# Patient Record
Sex: FEMALE | Race: WHITE | NOT HISPANIC OR LATINO | URBAN - METROPOLITAN AREA
[De-identification: names, ages, dates, MRNs, and addresses within clinical notes are randomized per-mention and may not be internally consistent; named-entity substitution may affect disease eponyms.]

---

## 2018-07-15 ENCOUNTER — EMERGENCY (EMERGENCY)
Facility: HOSPITAL | Age: 53
LOS: 1 days | Discharge: HOME | End: 2018-07-15
Attending: EMERGENCY MEDICINE | Admitting: EMERGENCY MEDICINE
Payer: COMMERCIAL

## 2018-07-15 VITALS
WEIGHT: 160.06 LBS | HEART RATE: 80 BPM | OXYGEN SATURATION: 99 % | HEIGHT: 61 IN | TEMPERATURE: 99 F | SYSTOLIC BLOOD PRESSURE: 130 MMHG | DIASTOLIC BLOOD PRESSURE: 89 MMHG | RESPIRATION RATE: 16 BRPM

## 2018-07-15 VITALS
SYSTOLIC BLOOD PRESSURE: 127 MMHG | RESPIRATION RATE: 16 BRPM | TEMPERATURE: 98 F | OXYGEN SATURATION: 99 % | HEART RATE: 82 BPM | DIASTOLIC BLOOD PRESSURE: 88 MMHG

## 2018-07-15 PROCEDURE — 70450 CT HEAD/BRAIN W/O DYE: CPT | Mod: 26

## 2018-07-15 PROCEDURE — 73502 X-RAY EXAM HIP UNI 2-3 VIEWS: CPT | Mod: 26,LT

## 2018-07-15 PROCEDURE — 99284 EMERGENCY DEPT VISIT MOD MDM: CPT

## 2018-07-15 PROCEDURE — 73502 X-RAY EXAM HIP UNI 2-3 VIEWS: CPT

## 2018-07-15 PROCEDURE — 99284 EMERGENCY DEPT VISIT MOD MDM: CPT | Mod: 25

## 2018-07-15 PROCEDURE — 73080 X-RAY EXAM OF ELBOW: CPT | Mod: 26,LT

## 2018-07-15 PROCEDURE — 73080 X-RAY EXAM OF ELBOW: CPT

## 2018-07-15 PROCEDURE — 70450 CT HEAD/BRAIN W/O DYE: CPT

## 2018-07-15 RX ORDER — ONDANSETRON 8 MG/1
1 TABLET, FILM COATED ORAL
Qty: 9 | Refills: 0
Start: 2018-07-15 | End: 2018-07-17

## 2018-07-15 RX ORDER — ONDANSETRON 8 MG/1
4 TABLET, FILM COATED ORAL ONCE
Qty: 0 | Refills: 0 | Status: COMPLETED | OUTPATIENT
Start: 2018-07-15 | End: 2018-07-15

## 2018-07-15 RX ORDER — MECLIZINE HCL 12.5 MG
25 TABLET ORAL ONCE
Qty: 0 | Refills: 0 | Status: COMPLETED | OUTPATIENT
Start: 2018-07-15 | End: 2018-07-15

## 2018-07-15 RX ORDER — MECLIZINE HCL 12.5 MG
1 TABLET ORAL
Qty: 9 | Refills: 0
Start: 2018-07-15 | End: 2018-07-17

## 2018-07-15 RX ORDER — ACETAMINOPHEN 500 MG
650 TABLET ORAL ONCE
Qty: 0 | Refills: 0 | Status: COMPLETED | OUTPATIENT
Start: 2018-07-15 | End: 2018-07-15

## 2018-07-15 RX ADMIN — Medication 650 MILLIGRAM(S): at 15:06

## 2018-07-15 RX ADMIN — ONDANSETRON 4 MILLIGRAM(S): 8 TABLET, FILM COATED ORAL at 15:06

## 2018-07-15 NOTE — ED PROVIDER NOTE - OBJECTIVE STATEMENT
51 y/o F with hx of hypothyroidism presents with c/o L elbow pain and headache s/p fall yesterday. Pt states that she tripped and fell while carrying a tray at home yesterday. States that she fell hitting her head, L elbow and L leg. Pt states that she has been having headache with mild dizziness and nausea since. Denies numbness, tingling, focal weakness, LOC, use of blood thinners, vomiting, vision changes, CP, SOB, abdominal pain, bowel/bladder incontinence, neck pain or other symptoms.

## 2018-07-15 NOTE — ED PROVIDER NOTE - UPPER EXTREMITY EXAM, LEFT
+ttp with mild healing abrasions and ecchymosis noted to L elbow with FROM, fingers warm& mobile, cap refill<2sec, pulses and sensation intact, NVI/BRUISING/TENDERNESS

## 2018-07-15 NOTE — ED PROVIDER NOTE - ATTENDING CONTRIBUTION TO CARE
I, Dr Tello, have personally performed a face to face diagnostic evaluation on this patient with the PA/NP. I have reviewed the PA/NP's note and agree with the history, Physical exam and plan of care, As per history 53 y/o F with hx of hypothyroidism presents with c/o L elbow pain and headache s/p fall yesterday. Pt states that she tripped and fell while carrying a tray at home yesterday. States that she fell hitting her head, L elbow and L leg. pertinent PE some scalp tenderness review of imaging study normal discharge home fallow up with PMD.

## 2018-07-15 NOTE — ED PROVIDER NOTE - MEDICAL DECISION MAKING DETAILS
53 y/o f s/p trip and fall at home yesterday with L elbow pain, headache and mild L hip pain; +nausea and dizziness; no neuro deficits, NVI; will get xrays, ct head, tylenol/zofran/meclizine, re-assess

## 2018-07-15 NOTE — ED PROVIDER NOTE - LOWER EXTREMITY EXAM, LEFT
TENDERNESS/+mild ttp L lateral anterior hip with FROM, no swelling/erythema/ecchymosis noted, skin intact, toes warm& mobile, NVI

## 2018-07-15 NOTE — ED PROVIDER NOTE - PROGRESS NOTE DETAILS
Pt examined by ED attending, Dr. Tello who agreed with disposition and plan. Reevaluated patient at bedside.  Patient feeling much improved.  Discussed the results of all diagnostic testing in ED and copies of all reports given.   An opportunity to ask questions was given.  Discussed the importance of prompt, close medical follow-up.  Patient will return with any changes, concerns or persistent / worsening symptoms.  Understanding of all instructions verbalized.

## 2018-07-15 NOTE — ED PROVIDER NOTE - ENMT, MLM
Airway patent, Nasal mucosa clear. Mouth with normal mucosa. Airway patent, Nasal mucosa clear. Mouth with normal mucosa. No scalp hematomas or ecchymosis noted.

## 2019-11-19 ENCOUNTER — EMERGENCY (EMERGENCY)
Facility: HOSPITAL | Age: 54
LOS: 1 days | Discharge: HOME | End: 2019-11-19
Attending: EMERGENCY MEDICINE | Admitting: EMERGENCY MEDICINE
Payer: COMMERCIAL

## 2019-11-19 VITALS
HEART RATE: 93 BPM | DIASTOLIC BLOOD PRESSURE: 90 MMHG | HEIGHT: 61 IN | SYSTOLIC BLOOD PRESSURE: 160 MMHG | TEMPERATURE: 98 F | RESPIRATION RATE: 16 BRPM | WEIGHT: 139.99 LBS | OXYGEN SATURATION: 98 %

## 2019-11-19 VITALS
OXYGEN SATURATION: 97 % | TEMPERATURE: 98 F | HEART RATE: 69 BPM | SYSTOLIC BLOOD PRESSURE: 124 MMHG | DIASTOLIC BLOOD PRESSURE: 78 MMHG | RESPIRATION RATE: 15 BRPM

## 2019-11-19 PROBLEM — E03.9 HYPOTHYROIDISM, UNSPECIFIED: Chronic | Status: ACTIVE | Noted: 2018-07-15

## 2019-11-19 LAB
ALBUMIN SERPL ELPH-MCNC: 4 G/DL — SIGNIFICANT CHANGE UP (ref 3.3–5)
ALP SERPL-CCNC: 78 U/L — SIGNIFICANT CHANGE UP (ref 30–120)
ALT FLD-CCNC: 38 U/L DA — SIGNIFICANT CHANGE UP (ref 10–60)
ANION GAP SERPL CALC-SCNC: 9 MMOL/L — SIGNIFICANT CHANGE UP (ref 5–17)
APPEARANCE UR: CLEAR — SIGNIFICANT CHANGE UP
APTT BLD: 33.3 SEC — SIGNIFICANT CHANGE UP (ref 28.5–37)
AST SERPL-CCNC: 19 U/L — SIGNIFICANT CHANGE UP (ref 10–40)
BASOPHILS # BLD AUTO: 0.05 K/UL — SIGNIFICANT CHANGE UP (ref 0–0.2)
BASOPHILS NFR BLD AUTO: 0.4 % — SIGNIFICANT CHANGE UP (ref 0–2)
BILIRUB SERPL-MCNC: 0.8 MG/DL — SIGNIFICANT CHANGE UP (ref 0.2–1.2)
BILIRUB UR-MCNC: NEGATIVE — SIGNIFICANT CHANGE UP
BUN SERPL-MCNC: 15 MG/DL — SIGNIFICANT CHANGE UP (ref 7–23)
CALCIUM SERPL-MCNC: 9.5 MG/DL — SIGNIFICANT CHANGE UP (ref 8.4–10.5)
CHLORIDE SERPL-SCNC: 103 MMOL/L — SIGNIFICANT CHANGE UP (ref 96–108)
CO2 SERPL-SCNC: 28 MMOL/L — SIGNIFICANT CHANGE UP (ref 22–31)
COLOR SPEC: YELLOW — SIGNIFICANT CHANGE UP
CREAT SERPL-MCNC: 1.01 MG/DL — SIGNIFICANT CHANGE UP (ref 0.5–1.3)
DIFF PNL FLD: ABNORMAL
EOSINOPHIL # BLD AUTO: 0.1 K/UL — SIGNIFICANT CHANGE UP (ref 0–0.5)
EOSINOPHIL NFR BLD AUTO: 0.7 % — SIGNIFICANT CHANGE UP (ref 0–6)
GLUCOSE SERPL-MCNC: 109 MG/DL — HIGH (ref 70–99)
GLUCOSE UR QL: NEGATIVE MG/DL — SIGNIFICANT CHANGE UP
HCG UR QL: NEGATIVE — SIGNIFICANT CHANGE UP
HCT VFR BLD CALC: 44.4 % — SIGNIFICANT CHANGE UP (ref 34.5–45)
HGB BLD-MCNC: 14.3 G/DL — SIGNIFICANT CHANGE UP (ref 11.5–15.5)
IMM GRANULOCYTES NFR BLD AUTO: 0.1 % — SIGNIFICANT CHANGE UP (ref 0–1.5)
INR BLD: 1.12 RATIO — SIGNIFICANT CHANGE UP (ref 0.88–1.16)
KETONES UR-MCNC: ABNORMAL
LEUKOCYTE ESTERASE UR-ACNC: ABNORMAL
LIDOCAIN IGE QN: 138 U/L — SIGNIFICANT CHANGE UP (ref 73–393)
LYMPHOCYTES # BLD AUTO: 1.54 K/UL — SIGNIFICANT CHANGE UP (ref 1–3.3)
LYMPHOCYTES # BLD AUTO: 11.5 % — LOW (ref 13–44)
MCHC RBC-ENTMCNC: 28.1 PG — SIGNIFICANT CHANGE UP (ref 27–34)
MCHC RBC-ENTMCNC: 32.2 GM/DL — SIGNIFICANT CHANGE UP (ref 32–36)
MCV RBC AUTO: 87.4 FL — SIGNIFICANT CHANGE UP (ref 80–100)
MONOCYTES # BLD AUTO: 0.6 K/UL — SIGNIFICANT CHANGE UP (ref 0–0.9)
MONOCYTES NFR BLD AUTO: 4.5 % — SIGNIFICANT CHANGE UP (ref 2–14)
NEUTROPHILS # BLD AUTO: 11.1 K/UL — HIGH (ref 1.8–7.4)
NEUTROPHILS NFR BLD AUTO: 82.8 % — HIGH (ref 43–77)
NITRITE UR-MCNC: NEGATIVE — SIGNIFICANT CHANGE UP
NRBC # BLD: 0 /100 WBCS — SIGNIFICANT CHANGE UP (ref 0–0)
PH UR: 5 — SIGNIFICANT CHANGE UP (ref 5–8)
PLATELET # BLD AUTO: 373 K/UL — SIGNIFICANT CHANGE UP (ref 150–400)
POTASSIUM SERPL-MCNC: 4.1 MMOL/L — SIGNIFICANT CHANGE UP (ref 3.5–5.3)
POTASSIUM SERPL-SCNC: 4.1 MMOL/L — SIGNIFICANT CHANGE UP (ref 3.5–5.3)
PROT SERPL-MCNC: 7.6 G/DL — SIGNIFICANT CHANGE UP (ref 6–8.3)
PROT UR-MCNC: 30 MG/DL
PROTHROM AB SERPL-ACNC: 12.2 SEC — SIGNIFICANT CHANGE UP (ref 10–12.9)
RBC # BLD: 5.08 M/UL — SIGNIFICANT CHANGE UP (ref 3.8–5.2)
RBC # FLD: 12.5 % — SIGNIFICANT CHANGE UP (ref 10.3–14.5)
SODIUM SERPL-SCNC: 140 MMOL/L — SIGNIFICANT CHANGE UP (ref 135–145)
SP GR SPEC: 1.02 — SIGNIFICANT CHANGE UP (ref 1.01–1.02)
UROBILINOGEN FLD QL: NEGATIVE MG/DL — SIGNIFICANT CHANGE UP
WBC # BLD: 13.41 K/UL — HIGH (ref 3.8–10.5)
WBC # FLD AUTO: 13.41 K/UL — HIGH (ref 3.8–10.5)

## 2019-11-19 PROCEDURE — 85027 COMPLETE CBC AUTOMATED: CPT

## 2019-11-19 PROCEDURE — 96374 THER/PROPH/DIAG INJ IV PUSH: CPT

## 2019-11-19 PROCEDURE — 81001 URINALYSIS AUTO W/SCOPE: CPT

## 2019-11-19 PROCEDURE — 85730 THROMBOPLASTIN TIME PARTIAL: CPT

## 2019-11-19 PROCEDURE — 81025 URINE PREGNANCY TEST: CPT

## 2019-11-19 PROCEDURE — 99284 EMERGENCY DEPT VISIT MOD MDM: CPT | Mod: 25

## 2019-11-19 PROCEDURE — 96361 HYDRATE IV INFUSION ADD-ON: CPT

## 2019-11-19 PROCEDURE — 80053 COMPREHEN METABOLIC PANEL: CPT

## 2019-11-19 PROCEDURE — 83690 ASSAY OF LIPASE: CPT

## 2019-11-19 PROCEDURE — 74176 CT ABD & PELVIS W/O CONTRAST: CPT

## 2019-11-19 PROCEDURE — 85610 PROTHROMBIN TIME: CPT

## 2019-11-19 PROCEDURE — 99284 EMERGENCY DEPT VISIT MOD MDM: CPT

## 2019-11-19 PROCEDURE — 96375 TX/PRO/DX INJ NEW DRUG ADDON: CPT

## 2019-11-19 PROCEDURE — 74176 CT ABD & PELVIS W/O CONTRAST: CPT | Mod: 26

## 2019-11-19 PROCEDURE — 36415 COLL VENOUS BLD VENIPUNCTURE: CPT

## 2019-11-19 PROCEDURE — 96376 TX/PRO/DX INJ SAME DRUG ADON: CPT

## 2019-11-19 RX ORDER — DULAGLUTIDE 4.5 MG/.5ML
1.75 INJECTION, SOLUTION SUBCUTANEOUS
Qty: 0 | Refills: 0 | DISCHARGE

## 2019-11-19 RX ORDER — LEVOTHYROXINE SODIUM 125 MCG
1 TABLET ORAL
Qty: 0 | Refills: 0 | DISCHARGE

## 2019-11-19 RX ORDER — FAMOTIDINE 10 MG/ML
20 INJECTION INTRAVENOUS ONCE
Refills: 0 | Status: COMPLETED | OUTPATIENT
Start: 2019-11-19 | End: 2019-11-19

## 2019-11-19 RX ORDER — ONDANSETRON 8 MG/1
4 TABLET, FILM COATED ORAL ONCE
Refills: 0 | Status: COMPLETED | OUTPATIENT
Start: 2019-11-19 | End: 2019-11-19

## 2019-11-19 RX ORDER — MOXIFLOXACIN HYDROCHLORIDE TABLETS, 400 MG 400 MG/1
1 TABLET, FILM COATED ORAL
Qty: 20 | Refills: 0
Start: 2019-11-19 | End: 2019-11-28

## 2019-11-19 RX ORDER — CIPROFLOXACIN LACTATE 400MG/40ML
400 VIAL (ML) INTRAVENOUS ONCE
Refills: 0 | Status: COMPLETED | OUTPATIENT
Start: 2019-11-19 | End: 2019-11-19

## 2019-11-19 RX ORDER — SODIUM CHLORIDE 9 MG/ML
1000 INJECTION INTRAMUSCULAR; INTRAVENOUS; SUBCUTANEOUS ONCE
Refills: 0 | Status: COMPLETED | OUTPATIENT
Start: 2019-11-19 | End: 2019-11-19

## 2019-11-19 RX ORDER — MORPHINE SULFATE 50 MG/1
2 CAPSULE, EXTENDED RELEASE ORAL ONCE
Refills: 0 | Status: DISCONTINUED | OUTPATIENT
Start: 2019-11-19 | End: 2019-11-19

## 2019-11-19 RX ORDER — AMLODIPINE BESYLATE 2.5 MG/1
1 TABLET ORAL
Qty: 0 | Refills: 0 | DISCHARGE

## 2019-11-19 RX ORDER — METRONIDAZOLE 500 MG
500 TABLET ORAL ONCE
Refills: 0 | Status: COMPLETED | OUTPATIENT
Start: 2019-11-19 | End: 2019-11-19

## 2019-11-19 RX ORDER — METRONIDAZOLE 500 MG
1 TABLET ORAL
Qty: 30 | Refills: 0
Start: 2019-11-19 | End: 2019-11-28

## 2019-11-19 RX ORDER — MORPHINE SULFATE 50 MG/1
4 CAPSULE, EXTENDED RELEASE ORAL ONCE
Refills: 0 | Status: DISCONTINUED | OUTPATIENT
Start: 2019-11-19 | End: 2019-11-19

## 2019-11-19 RX ORDER — IOHEXOL 300 MG/ML
30 INJECTION, SOLUTION INTRAVENOUS ONCE
Refills: 0 | Status: COMPLETED | OUTPATIENT
Start: 2019-11-19 | End: 2019-11-19

## 2019-11-19 RX ORDER — ONDANSETRON 8 MG/1
1 TABLET, FILM COATED ORAL
Qty: 10 | Refills: 0
Start: 2019-11-19 | End: 2019-11-21

## 2019-11-19 RX ADMIN — MORPHINE SULFATE 4 MILLIGRAM(S): 50 CAPSULE, EXTENDED RELEASE ORAL at 13:01

## 2019-11-19 RX ADMIN — Medication 100 MILLIGRAM(S): at 16:26

## 2019-11-19 RX ADMIN — ONDANSETRON 4 MILLIGRAM(S): 8 TABLET, FILM COATED ORAL at 13:01

## 2019-11-19 RX ADMIN — MORPHINE SULFATE 2 MILLIGRAM(S): 50 CAPSULE, EXTENDED RELEASE ORAL at 16:25

## 2019-11-19 RX ADMIN — SODIUM CHLORIDE 1000 MILLILITER(S): 9 INJECTION INTRAMUSCULAR; INTRAVENOUS; SUBCUTANEOUS at 14:38

## 2019-11-19 RX ADMIN — SODIUM CHLORIDE 1000 MILLILITER(S): 9 INJECTION INTRAMUSCULAR; INTRAVENOUS; SUBCUTANEOUS at 13:43

## 2019-11-19 RX ADMIN — MORPHINE SULFATE 2 MILLIGRAM(S): 50 CAPSULE, EXTENDED RELEASE ORAL at 14:35

## 2019-11-19 RX ADMIN — MORPHINE SULFATE 4 MILLIGRAM(S): 50 CAPSULE, EXTENDED RELEASE ORAL at 13:44

## 2019-11-19 RX ADMIN — Medication 200 MILLIGRAM(S): at 15:08

## 2019-11-19 RX ADMIN — FAMOTIDINE 20 MILLIGRAM(S): 10 INJECTION INTRAVENOUS at 13:00

## 2019-11-19 RX ADMIN — SODIUM CHLORIDE 1000 MILLILITER(S): 9 INJECTION INTRAMUSCULAR; INTRAVENOUS; SUBCUTANEOUS at 13:42

## 2019-11-19 RX ADMIN — MORPHINE SULFATE 2 MILLIGRAM(S): 50 CAPSULE, EXTENDED RELEASE ORAL at 17:09

## 2019-11-19 RX ADMIN — SODIUM CHLORIDE 1000 MILLILITER(S): 9 INJECTION INTRAMUSCULAR; INTRAVENOUS; SUBCUTANEOUS at 14:35

## 2019-11-19 RX ADMIN — SODIUM CHLORIDE 1000 MILLILITER(S): 9 INJECTION INTRAMUSCULAR; INTRAVENOUS; SUBCUTANEOUS at 13:00

## 2019-11-19 NOTE — ED PROVIDER NOTE - NSFOLLOWUPINSTRUCTIONS_ED_ALL_ED_FT
clear liquid diet next 8-12 hours, advance to BRAT diet as tolerated. continue cipro and flagyl next 10 days. percocet for severe pain. zofran for nausea. have close follow up with GI. referral provided. return for worsening pain or symptoms

## 2019-11-19 NOTE — ED PROVIDER NOTE - CARE PROVIDER_API CALL
Edmond Aviles ()  Internal Medicine  [address] [place] [address]  Phone: [phone]  Fax: [phone]  Follow Up Time:     Zacarias Palmer)  Internal Medicine  [address] [place] [address]  Phone: [phone]  Fax: [phone]  Follow Up Time:

## 2019-11-19 NOTE — ED PROVIDER NOTE - OBJECTIVE STATEMENT
54 year old female with history of thyroid removed (due to nodules, benign per patient) and HTN presents with lower abd cramping and diarrhea since midnight. States she was fine last night and ate candy for dinner. started to have some lower abd cramping last night around midnight. has had over 20 episodes of diarrhea. started to become blood tinged around 4:30 am and is now diffusely bloody 54 year old female with history of thyroid removed (due to nodules, benign per patient) and HTN presents with lower abd cramping and diarrhea since midnight. States she was fine last night and ate candy for dinner. started to have some lower abd cramping last night around midnight. has had over 20 episodes of diarrhea. started to become blood tinged around 4:30 am and is now diffusely bloody. history of similar symptoms including bloody diarrhea 15 years ago while pregnant. was given IV fluids and symptoms improved on own. patient also reports starting trulicity a few months ago (started on to lose weight). takes once a week. recently had dose increased and thinks may be related.   no PCP (goes to urgent care at [facility])  endocrinologist Clara

## 2019-11-19 NOTE — ED PROVIDER NOTE - CLINICAL SUMMARY MEDICAL DECISION MAKING FREE TEXT BOX
lower abd pain and cramping started around midnight with bloody diarrhea. differentials include but not limited to colitis, diverticulitis, perforation, abscess, renal colic. will order labs, UA, CT abdomen/pelvis, pain control.

## 2019-11-19 NOTE — ED PROVIDER NOTE - PATIENT PORTAL LINK FT
You can access the FollowMyHealth Patient Portal offered by [facility] by registering at the following website: [email or web address]. By joining [employer]’s FollowMyHealth portal, you will also be able to view your health information using other applications (apps) compatible with our system.

## 2019-11-19 NOTE — ED PROVIDER NOTE - PROGRESS NOTE DETAILS
Lei DE LOS SANTOS for ED attending, Dr. Verduzco : 55 y/o female with a PMHx of thyroidectomy, HTN presents to the ED c/o diarrhea since 12am today. Pt states around midnight today she developed severe lower abd pain, cramping and diarrhea, ~5am diarrhea became bloody. Also feels some chills and nausea. Denies fever, vomiting. Allergy to penicillin, shellfish, has allergic rx to IV contrast in past.  PMD: Arslan Heredia ([facility])  No GI.   PE: wd, wn, NAD, pink conjunctiva, dry mmm, s1, s2, rrr, lungs cta b/l, abd soft, tender lower abd, nontender upper abd. patient overall feeling improved, but continues to have tenderness to lower abd. due to continued tenderness, recommended admission for continued IV hydration, IV abd, and continued monitoring. patient declines admission. states she is feeling better and would be more comfortable at home. will have close follow up with GI. referrals provided. return precautions explained. taking po fluids. ambulatory with steady gait

## 2019-11-19 NOTE — ED PROVIDER NOTE - CONSTITUTIONAL, MLM
normal... Well appearing, well nourished, awake, alert, oriented to person, place, time/situation and in no apparent distress. Well appearing, well nourished, awake, alert, oriented to person, place, time/situation. appears uncomfortable

## 2021-07-06 ENCOUNTER — TRANSCRIPTION ENCOUNTER (OUTPATIENT)
Age: 56
End: 2021-07-06

## 2021-11-13 ENCOUNTER — TRANSCRIPTION ENCOUNTER (OUTPATIENT)
Age: 56
End: 2021-11-13

## 2022-04-09 ENCOUNTER — TRANSCRIPTION ENCOUNTER (OUTPATIENT)
Age: 57
End: 2022-04-09

## 2022-07-03 ENCOUNTER — NON-APPOINTMENT (OUTPATIENT)
Age: 57
End: 2022-07-03

## 2024-04-24 ENCOUNTER — APPOINTMENT (OUTPATIENT)
Dept: OBGYN | Facility: CLINIC | Age: 59
End: 2024-04-24

## 2025-03-06 ENCOUNTER — OFFICE (OUTPATIENT)
Dept: URBAN - METROPOLITAN AREA CLINIC 12 | Facility: CLINIC | Age: 60
Setting detail: OPHTHALMOLOGY
End: 2025-03-06
Payer: COMMERCIAL

## 2025-03-06 DIAGNOSIS — D31.40: ICD-10-CM

## 2025-03-06 DIAGNOSIS — H20.012: ICD-10-CM

## 2025-03-06 PROCEDURE — 92004 COMPRE OPH EXAM NEW PT 1/>: CPT | Performed by: STUDENT IN AN ORGANIZED HEALTH CARE EDUCATION/TRAINING PROGRAM

## 2025-03-06 ASSESSMENT — KERATOMETRY
METHOD_AUTO_MANUAL: AUTO
OD_K2POWER_DIOPTERS: 43.25
OD_K1POWER_DIOPTERS: 42.50
OS_K2POWER_DIOPTERS: 43.50
OS_K1POWER_DIOPTERS: 42.50
OD_AXISANGLE_DEGREES: 003
OS_AXISANGLE_DEGREES: 001

## 2025-03-06 ASSESSMENT — CONFRONTATIONAL VISUAL FIELD TEST (CVF)
OD_FINDINGS: FULL
OS_FINDINGS: FULL

## 2025-03-06 ASSESSMENT — TONOMETRY
OS_IOP_MMHG: 20
OD_IOP_MMHG: 17

## 2025-03-06 ASSESSMENT — REFRACTION_CURRENTRX
OS_OVR_VA: 20/
OD_CYLINDER: +1.00
OD_SPHERE: -0.25
OD_AXIS: 169
OS_VPRISM_DIRECTION: PROGS
OD_OVR_VA: 20/
OS_ADD: +1.75
OS_SPHERE: -1.00
OD_VPRISM_DIRECTION: PROGS
OD_ADD: +1.75
OS_CYLINDER: +0.25
OS_AXIS: 154

## 2025-03-06 ASSESSMENT — REFRACTION_MANIFEST
OD_AXIS: 010
OD_SPHERE: -0.25
OS_ADD: +2.50
OD_CYLINDER: +0.50
OS_VA1: 20/20
OS_AXIS: 180
OS_CYLINDER: +0.50
OS_SPHERE: PLANO
OD_ADD: +2.50
OD_VA1: 20/20

## 2025-03-06 ASSESSMENT — VISUAL ACUITY
OS_BCVA: 20/25+3
OD_BCVA: 20/30+2

## 2025-03-06 ASSESSMENT — LID EXAM ASSESSMENTS
OS_COMMENTS: NO FB
OS_COMMENTS: UPPER EYELID EVERTED

## 2025-03-06 ASSESSMENT — REFRACTION_AUTOREFRACTION
OS_AXIS: 089
OS_CYLINDER: -1.00
OD_SPHERE: +1.25
OD_AXIS: 096
OD_CYLINDER: -1.00
OS_SPHERE: +1.25

## 2025-03-08 ENCOUNTER — RX ONLY (RX ONLY)
Age: 60
End: 2025-03-08

## 2025-03-08 ENCOUNTER — OFFICE (OUTPATIENT)
Dept: URBAN - METROPOLITAN AREA CLINIC 12 | Facility: CLINIC | Age: 60
Setting detail: OPHTHALMOLOGY
End: 2025-03-08
Payer: COMMERCIAL

## 2025-03-08 DIAGNOSIS — D31.40: ICD-10-CM

## 2025-03-08 DIAGNOSIS — H20.012: ICD-10-CM

## 2025-03-08 PROCEDURE — 99213 OFFICE O/P EST LOW 20 MIN: CPT | Performed by: STUDENT IN AN ORGANIZED HEALTH CARE EDUCATION/TRAINING PROGRAM

## 2025-03-08 ASSESSMENT — REFRACTION_CURRENTRX
OS_AXIS: 154
OS_ADD: +1.75
OS_OVR_VA: 20/
OD_ADD: +1.75
OS_VPRISM_DIRECTION: PROGS
OD_SPHERE: -0.25
OS_SPHERE: -1.00
OS_CYLINDER: +0.25
OD_OVR_VA: 20/
OD_CYLINDER: +1.00
OD_VPRISM_DIRECTION: PROGS
OD_AXIS: 169

## 2025-03-08 ASSESSMENT — REFRACTION_AUTOREFRACTION
OD_CYLINDER: -1.00
OD_SPHERE: +1.25
OS_SPHERE: +1.50
OS_AXIS: 090
OS_CYLINDER: -1.25
OD_AXIS: 096

## 2025-03-08 ASSESSMENT — REFRACTION_MANIFEST
OS_ADD: +2.50
OD_CYLINDER: +0.50
OD_AXIS: 010
OD_SPHERE: -0.25
OS_VA1: 20/20
OS_AXIS: 180
OD_VA1: 20/20
OD_ADD: +2.50
OS_CYLINDER: +0.50
OS_SPHERE: PLANO

## 2025-03-08 ASSESSMENT — CONFRONTATIONAL VISUAL FIELD TEST (CVF)
OD_FINDINGS: FULL
OS_FINDINGS: FULL

## 2025-03-08 ASSESSMENT — KERATOMETRY
OS_K1POWER_DIOPTERS: 42.25
OD_AXISANGLE_DEGREES: 004
OS_K2POWER_DIOPTERS: 43.25
OD_K2POWER_DIOPTERS: 43.25
METHOD_AUTO_MANUAL: AUTO
OD_K1POWER_DIOPTERS: 42.25
OS_AXISANGLE_DEGREES: 179

## 2025-03-08 ASSESSMENT — TONOMETRY
OS_IOP_MMHG: 15
OD_IOP_MMHG: 18

## 2025-03-08 ASSESSMENT — VISUAL ACUITY
OS_BCVA: 20/25-2
OD_BCVA: 20/20-

## 2025-03-08 ASSESSMENT — LID EXAM ASSESSMENTS
OS_COMMENTS: NO FB
OS_COMMENTS: UPPER EYELID EVERTED

## 2025-03-17 ENCOUNTER — OFFICE (OUTPATIENT)
Dept: URBAN - METROPOLITAN AREA CLINIC 12 | Facility: CLINIC | Age: 60
Setting detail: OPHTHALMOLOGY
End: 2025-03-17
Payer: COMMERCIAL

## 2025-03-17 DIAGNOSIS — D31.40: ICD-10-CM

## 2025-03-17 DIAGNOSIS — H16.222: ICD-10-CM

## 2025-03-17 DIAGNOSIS — H20.012: ICD-10-CM

## 2025-03-17 PROCEDURE — 99213 OFFICE O/P EST LOW 20 MIN: CPT | Performed by: STUDENT IN AN ORGANIZED HEALTH CARE EDUCATION/TRAINING PROGRAM

## 2025-03-17 ASSESSMENT — REFRACTION_AUTOREFRACTION
OD_SPHERE: +0.75
OS_CYLINDER: -0.75
OS_SPHERE: +0.75
OD_AXIS: 091
OD_CYLINDER: -0.75
OS_AXIS: 091

## 2025-03-17 ASSESSMENT — KERATOMETRY
OD_K1POWER_DIOPTERS: 42.75
OS_K2POWER_DIOPTERS: 43.25
OS_AXISANGLE_DEGREES: 001
OD_AXISANGLE_DEGREES: 171
OS_K1POWER_DIOPTERS: 42.75
OD_K2POWER_DIOPTERS: 43.25
METHOD_AUTO_MANUAL: AUTO

## 2025-03-17 ASSESSMENT — REFRACTION_CURRENTRX
OD_VPRISM_DIRECTION: PROGS
OD_OVR_VA: 20/
OS_ADD: +1.75
OS_CYLINDER: +0.25
OD_AXIS: 169
OS_OVR_VA: 20/
OD_ADD: +1.75
OS_VPRISM_DIRECTION: PROGS
OD_SPHERE: -0.25
OS_AXIS: 154
OS_SPHERE: -1.00
OD_CYLINDER: +1.00

## 2025-03-17 ASSESSMENT — CONFRONTATIONAL VISUAL FIELD TEST (CVF)
OS_FINDINGS: FULL
OD_FINDINGS: FULL

## 2025-03-17 ASSESSMENT — REFRACTION_MANIFEST
OS_VA1: 20/20
OS_CYLINDER: +0.50
OD_ADD: +2.50
OD_SPHERE: -0.25
OS_SPHERE: PLANO
OS_ADD: +2.50
OD_CYLINDER: +0.50
OD_VA1: 20/20
OD_AXIS: 010
OS_AXIS: 180

## 2025-03-17 ASSESSMENT — TONOMETRY
OS_IOP_MMHG: 18
OD_IOP_MMHG: 17

## 2025-03-17 ASSESSMENT — LID EXAM ASSESSMENTS
OS_COMMENTS: UPPER EYELID EVERTED
OS_COMMENTS: NO FB

## 2025-03-17 ASSESSMENT — VISUAL ACUITY
OS_BCVA: 20/20
OD_BCVA: 20/20-2

## 2025-03-17 ASSESSMENT — SUPERFICIAL PUNCTATE KERATITIS (SPK): OS_SPK: 1+

## 2025-04-12 ENCOUNTER — OFFICE (OUTPATIENT)
Dept: URBAN - METROPOLITAN AREA CLINIC 12 | Facility: CLINIC | Age: 60
Setting detail: OPHTHALMOLOGY
End: 2025-04-12
Payer: COMMERCIAL

## 2025-04-12 DIAGNOSIS — D31.40: ICD-10-CM

## 2025-04-12 DIAGNOSIS — H16.222: ICD-10-CM

## 2025-04-12 DIAGNOSIS — H20.012: ICD-10-CM

## 2025-04-12 PROCEDURE — 99213 OFFICE O/P EST LOW 20 MIN: CPT | Performed by: STUDENT IN AN ORGANIZED HEALTH CARE EDUCATION/TRAINING PROGRAM

## 2025-04-12 ASSESSMENT — REFRACTION_CURRENTRX
OS_CYLINDER: +0.25
OD_OVR_VA: 20/
OD_SPHERE: -0.25
OD_AXIS: 169
OS_ADD: +1.75
OS_SPHERE: -1.00
OD_ADD: +1.75
OS_VPRISM_DIRECTION: PROGS
OD_VPRISM_DIRECTION: PROGS
OS_OVR_VA: 20/
OD_CYLINDER: +1.00
OS_AXIS: 154

## 2025-04-12 ASSESSMENT — REFRACTION_AUTOREFRACTION
OD_SPHERE: +1.00
OD_CYLINDER: -1.00
OS_SPHERE: +1.25
OS_CYLINDER: -1.00
OD_AXIS: 093
OS_AXIS: 087

## 2025-04-12 ASSESSMENT — KERATOMETRY
OS_AXISANGLE_DEGREES: 178
OS_K2POWER_DIOPTERS: 43.50
OS_K1POWER_DIOPTERS: 42.50
OD_AXISANGLE_DEGREES: 176
OD_K2POWER_DIOPTERS: 43.25
OD_K1POWER_DIOPTERS: 42.50
METHOD_AUTO_MANUAL: AUTO

## 2025-04-12 ASSESSMENT — REFRACTION_MANIFEST
OD_VA1: 20/20
OD_CYLINDER: +0.50
OS_VA1: 20/20
OS_AXIS: 180
OD_SPHERE: -0.25
OS_SPHERE: PLANO
OD_AXIS: 010
OS_CYLINDER: +0.50
OS_ADD: +2.50
OD_ADD: +2.50

## 2025-04-12 ASSESSMENT — LID EXAM ASSESSMENTS
OS_COMMENTS: UPPER EYELID EVERTED
OS_COMMENTS: NO FB

## 2025-04-12 ASSESSMENT — TONOMETRY
OD_IOP_MMHG: 18
OS_IOP_MMHG: 15

## 2025-04-12 ASSESSMENT — SUPERFICIAL PUNCTATE KERATITIS (SPK): OS_SPK: 1+

## 2025-04-12 ASSESSMENT — VISUAL ACUITY
OD_BCVA: 20/20-2
OS_BCVA: 20/20-

## 2025-04-12 ASSESSMENT — CONFRONTATIONAL VISUAL FIELD TEST (CVF)
OS_FINDINGS: FULL
OD_FINDINGS: FULL

## 2025-05-05 ENCOUNTER — RX ONLY (RX ONLY)
Age: 60
End: 2025-05-05

## 2025-05-05 ENCOUNTER — OFFICE (OUTPATIENT)
Dept: URBAN - METROPOLITAN AREA CLINIC 12 | Facility: CLINIC | Age: 60
Setting detail: OPHTHALMOLOGY
End: 2025-05-05
Payer: COMMERCIAL

## 2025-05-05 DIAGNOSIS — D31.40: ICD-10-CM

## 2025-05-05 DIAGNOSIS — H20.012: ICD-10-CM

## 2025-05-05 DIAGNOSIS — H16.222: ICD-10-CM

## 2025-05-05 PROCEDURE — 99213 OFFICE O/P EST LOW 20 MIN: CPT | Performed by: STUDENT IN AN ORGANIZED HEALTH CARE EDUCATION/TRAINING PROGRAM

## 2025-05-05 ASSESSMENT — REFRACTION_CURRENTRX
OD_ADD: +1.75
OS_SPHERE: -1.00
OS_AXIS: 154
OD_CYLINDER: +1.00
OD_OVR_VA: 20/
OD_SPHERE: -0.25
OS_OVR_VA: 20/
OD_AXIS: 169
OS_VPRISM_DIRECTION: PROGS
OS_CYLINDER: +0.25
OD_VPRISM_DIRECTION: PROGS
OS_ADD: +1.75

## 2025-05-05 ASSESSMENT — REFRACTION_AUTOREFRACTION
OS_AXIS: 081
OD_AXIS: 094
OS_CYLINDER: -1.00
OD_CYLINDER: -1.00
OS_SPHERE: +0.75
OD_SPHERE: +0.75

## 2025-05-05 ASSESSMENT — REFRACTION_MANIFEST
OD_VA1: 20/20
OD_CYLINDER: +0.50
OD_SPHERE: -0.25
OS_VA1: 20/20
OD_AXIS: 010
OS_CYLINDER: +0.50
OS_ADD: +2.50
OS_SPHERE: PLANO
OD_ADD: +2.50
OS_AXIS: 180

## 2025-05-05 ASSESSMENT — KERATOMETRY
OD_K2POWER_DIOPTERS: 43.25
OS_AXISANGLE_DEGREES: 170
OD_K1POWER_DIOPTERS: 42.50
OS_K2POWER_DIOPTERS: 43.50
OS_K1POWER_DIOPTERS: 42.75
METHOD_AUTO_MANUAL: AUTO
OD_AXISANGLE_DEGREES: 177

## 2025-05-05 ASSESSMENT — CONFRONTATIONAL VISUAL FIELD TEST (CVF)
OD_FINDINGS: FULL
OS_FINDINGS: FULL

## 2025-05-05 ASSESSMENT — LID EXAM ASSESSMENTS
OS_COMMENTS: NO FB
OS_COMMENTS: UPPER EYELID EVERTED

## 2025-05-05 ASSESSMENT — VISUAL ACUITY
OD_BCVA: 20/20-2
OS_BCVA: 20/20-

## 2025-05-05 ASSESSMENT — TONOMETRY
OD_IOP_MMHG: 16
OS_IOP_MMHG: 16

## 2025-05-05 ASSESSMENT — SUPERFICIAL PUNCTATE KERATITIS (SPK): OS_SPK: T

## 2025-06-09 ENCOUNTER — OFFICE (OUTPATIENT)
Dept: URBAN - METROPOLITAN AREA CLINIC 12 | Facility: CLINIC | Age: 60
Setting detail: OPHTHALMOLOGY
End: 2025-06-09
Payer: COMMERCIAL

## 2025-06-09 DIAGNOSIS — H16.223: ICD-10-CM

## 2025-06-09 DIAGNOSIS — H16.222: ICD-10-CM

## 2025-06-09 DIAGNOSIS — H16.221: ICD-10-CM

## 2025-06-09 DIAGNOSIS — H57.12: ICD-10-CM

## 2025-06-09 DIAGNOSIS — D31.40: ICD-10-CM

## 2025-06-09 PROCEDURE — 92012 INTRM OPH EXAM EST PATIENT: CPT | Performed by: STUDENT IN AN ORGANIZED HEALTH CARE EDUCATION/TRAINING PROGRAM

## 2025-06-09 ASSESSMENT — TONOMETRY
OD_IOP_MMHG: 21
OS_IOP_MMHG: 20

## 2025-06-09 ASSESSMENT — LID EXAM ASSESSMENTS
OS_COMMENTS: UPPER EYELID EVERTED
OS_COMMENTS: NO FB

## 2025-06-09 ASSESSMENT — SUPERFICIAL PUNCTATE KERATITIS (SPK): OS_SPK: T

## 2025-06-09 ASSESSMENT — CONFRONTATIONAL VISUAL FIELD TEST (CVF)
OD_FINDINGS: FULL
OS_FINDINGS: FULL

## 2025-06-10 PROBLEM — H57.12 OCULAR PAIN; LEFT EYE: Status: ACTIVE | Noted: 2025-06-09

## 2025-06-26 ASSESSMENT — REFRACTION_AUTOREFRACTION
OS_SPHERE: +0.75
OS_CYLINDER: -0.75
OD_AXIS: 93
OS_AXIS: 85
OD_CYLINDER: -1.00
OD_SPHERE: +1.00

## 2025-06-26 ASSESSMENT — REFRACTION_MANIFEST
OD_CYLINDER: +0.50
OS_AXIS: 180
OD_AXIS: 010
OD_VA1: 20/20
OS_ADD: +2.50
OS_CYLINDER: +0.50
OS_VA1: 20/20
OS_SPHERE: PLANO
OD_ADD: +2.50
OD_SPHERE: -0.25

## 2025-06-26 ASSESSMENT — REFRACTION_CURRENTRX
OD_SPHERE: -0.25
OS_CYLINDER: +0.25
OD_AXIS: 169
OD_VPRISM_DIRECTION: PROGS
OS_AXIS: 154
OS_OVR_VA: 20/
OD_ADD: +1.75
OS_VPRISM_DIRECTION: PROGS
OS_ADD: +1.75
OD_CYLINDER: +1.00
OD_OVR_VA: 20/
OS_SPHERE: -1.00

## 2025-06-26 ASSESSMENT — KERATOMETRY
METHOD_AUTO_MANUAL: AUTO
OD_K1POWER_DIOPTERS: 42.50
OD_K2POWER_DIOPTERS: 43.25
OS_K1POWER_DIOPTERS: 43.00
OD_AXISANGLE_DEGREES: 180
OS_K2POWER_DIOPTERS: 43.50
OS_AXISANGLE_DEGREES: 1

## 2025-06-26 ASSESSMENT — VISUAL ACUITY
OD_BCVA: 20/20-2
OS_BCVA: 20/20-